# Patient Record
Sex: FEMALE | Race: BLACK OR AFRICAN AMERICAN | Employment: FULL TIME | URBAN - METROPOLITAN AREA
[De-identification: names, ages, dates, MRNs, and addresses within clinical notes are randomized per-mention and may not be internally consistent; named-entity substitution may affect disease eponyms.]

---

## 2017-02-02 ENCOUNTER — OFFICE VISIT (OUTPATIENT)
Dept: FAMILY MEDICINE CLINIC | Age: 22
End: 2017-02-02

## 2017-02-02 VITALS
WEIGHT: 206 LBS | HEART RATE: 83 BPM | RESPIRATION RATE: 18 BRPM | SYSTOLIC BLOOD PRESSURE: 176 MMHG | DIASTOLIC BLOOD PRESSURE: 112 MMHG | HEIGHT: 65 IN | BODY MASS INDEX: 34.32 KG/M2 | OXYGEN SATURATION: 99 % | TEMPERATURE: 97.9 F

## 2017-02-02 DIAGNOSIS — I10 ESSENTIAL HYPERTENSION WITH GOAL BLOOD PRESSURE LESS THAN 140/90: Primary | ICD-10-CM

## 2017-02-02 DIAGNOSIS — F33.2 SEVERE EPISODE OF RECURRENT MAJOR DEPRESSIVE DISORDER, WITHOUT PSYCHOTIC FEATURES (HCC): ICD-10-CM

## 2017-02-02 DIAGNOSIS — R55 SYNCOPE, UNSPECIFIED SYNCOPE TYPE: ICD-10-CM

## 2017-02-02 DIAGNOSIS — Z13.1 SCREENING FOR DIABETES MELLITUS (DM): ICD-10-CM

## 2017-02-02 RX ORDER — SERTRALINE HYDROCHLORIDE 100 MG/1
100 TABLET, FILM COATED ORAL DAILY
Qty: 30 TAB | Refills: 2 | Status: SHIPPED | OUTPATIENT
Start: 2017-02-02 | End: 2017-04-04 | Stop reason: SDUPTHER

## 2017-02-02 RX ORDER — HYDROCHLOROTHIAZIDE 25 MG/1
25 TABLET ORAL DAILY
Qty: 30 TAB | Refills: 2 | Status: SHIPPED | OUTPATIENT
Start: 2017-02-02 | End: 2017-04-04 | Stop reason: SDUPTHER

## 2017-02-02 NOTE — PATIENT INSTRUCTIONS
Stop lexapro and start on Zoloft. - take every day. Start taking HCTZ in addition to your present Blood pressure medication. High Blood Pressure: Care Instructions  Your Care Instructions  If your blood pressure is usually above 140/90, you have high blood pressure, or hypertension. That means the top number is 140 or higher or the bottom number is 90 or higher, or both. Despite what a lot of people think, high blood pressure usually doesn't cause headaches or make you feel dizzy or lightheaded. It usually has no symptoms. But it does increase your risk for heart attack, stroke, and kidney or eye damage. The higher your blood pressure, the more your risk increases. Your doctor will give you a goal for your blood pressure. Your goal will be based on your health and your age. An example of a goal is to keep your blood pressure below 140/90. Lifestyle changes, such as eating healthy and being active, are always important to help lower blood pressure. You might also take medicine to reach your blood pressure goal.  Follow-up care is a key part of your treatment and safety. Be sure to make and go to all appointments, and call your doctor if you are having problems. It's also a good idea to know your test results and keep a list of the medicines you take. How can you care for yourself at home? Medical treatment  · If you stop taking your medicine, your blood pressure will go back up. You may take one or more types of medicine to lower your blood pressure. Be safe with medicines. Take your medicine exactly as prescribed. Call your doctor if you think you are having a problem with your medicine. · Talk to your doctor before you start taking aspirin every day. Aspirin can help certain people lower their risk of a heart attack or stroke. But taking aspirin isn't right for everyone, because it can cause serious bleeding. · See your doctor regularly.  You may need to see the doctor more often at first or until your blood pressure comes down. · If you are taking blood pressure medicine, talk to your doctor before you take decongestants or anti-inflammatory medicine, such as ibuprofen. Some of these medicines can raise blood pressure. · Learn how to check your blood pressure at home. Lifestyle changes  · Stay at a healthy weight. This is especially important if you put on weight around the waist. Losing even 10 pounds can help you lower your blood pressure. · If your doctor recommends it, get more exercise. Walking is a good choice. Bit by bit, increase the amount you walk every day. Try for at least 30 minutes on most days of the week. You also may want to swim, bike, or do other activities. · Avoid or limit alcohol. Talk to your doctor about whether you can drink any alcohol. · Try to limit how much sodium you eat to less than 2,300 milligrams (mg) a day. Your doctor may ask you to try to eat less than 1,500 mg a day. · Eat plenty of fruits (such as bananas and oranges), vegetables, legumes, whole grains, and low-fat dairy products. · Lower the amount of saturated fat in your diet. Saturated fat is found in animal products such as milk, cheese, and meat. Limiting these foods may help you lose weight and also lower your risk for heart disease. · Do not smoke. Smoking increases your risk for heart attack and stroke. If you need help quitting, talk to your doctor about stop-smoking programs and medicines. These can increase your chances of quitting for good. When should you call for help? Call 911 anytime you think you may need emergency care. This may mean having symptoms that suggest that your blood pressure is causing a serious heart or blood vessel problem. Your blood pressure may be over 180/110. For example, call 911 if:  · You have symptoms of a heart attack. These may include:  ¨ Chest pain or pressure, or a strange feeling in the chest.  ¨ Sweating. ¨ Shortness of breath. ¨ Nausea or vomiting.   ¨ Pain, pressure, or a strange feeling in the back, neck, jaw, or upper belly or in one or both shoulders or arms. ¨ Lightheadedness or sudden weakness. ¨ A fast or irregular heartbeat. · You have symptoms of a stroke. These may include:  ¨ Sudden numbness, tingling, weakness, or loss of movement in your face, arm, or leg, especially on only one side of your body. ¨ Sudden vision changes. ¨ Sudden trouble speaking. ¨ Sudden confusion or trouble understanding simple statements. ¨ Sudden problems with walking or balance. ¨ A sudden, severe headache that is different from past headaches. · You have severe back or belly pain. Do not wait until your blood pressure comes down on its own. Get help right away. Call your doctor now or seek immediate care if:  · Your blood pressure is much higher than normal (such as 180/110 or higher), but you don't have symptoms. · You think high blood pressure is causing symptoms, such as:  ¨ Severe headache. ¨ Blurry vision. Watch closely for changes in your health, and be sure to contact your doctor if:  · Your blood pressure measures 140/90 or higher at least 2 times. That means the top number is 140 or higher or the bottom number is 90 or higher, or both. · You think you may be having side effects from your blood pressure medicine. · Your blood pressure is usually normal, but it goes above normal at least 2 times. Where can you learn more? Go to http://taylor-kasia.info/. Enter O942 in the search box to learn more about \"High Blood Pressure: Care Instructions. \"  Current as of: August 8, 2016  Content Version: 11.1  © 5257-9363 Healthwise, Incorporated. Care instructions adapted under license by [employer] (which disclaims liability or warranty for this information).  If you have questions about a medical condition or this instruction, always ask your healthcare professional. [address] any warranty or liability for your use of this information.

## 2017-02-02 NOTE — MR AVS SNAPSHOT
Visit Information Date & Time Provider Department Dept. Phone Encounter #  
 2/2/2017  3:30 PM Rolando Barnes NP [address] [phone] [id number] Upcoming Health Maintenance Date Due  
 HPV AGE 9Y-34Y (1 of 3 - Female 3 Dose Series) 12/22/2004 DTaP/Tdap/Td series (1 - Tdap) 12/22/2014 PAP AKA CERVICAL CYTOLOGY 12/22/2014 INFLUENZA AGE 9 TO ADULT 8/1/2016 Allergies as of 2/2/2017  Review Complete On: 2/2/2017 By: Rolando Barnes NP Severity Noted Reaction Type Reactions Amoxicillin  05/31/2016    Hives Penicillins  05/31/2016    Hives Current Immunizations  Never Reviewed No immunizations on file. Not reviewed this visit You Were Diagnosed With   
  
 Codes Comments Essential hypertension with goal blood pressure less than 140/90    -  Primary ICD-10-CM: I10 
ICD-9-CM: 401.9 Severe episode of recurrent major depressive disorder, without psychotic features ([facility]     ICD-10-CM: F33.2 ICD-9-CM: 296.33 Syncope, unspecified syncope type     ICD-10-CM: R55 
ICD-9-CM: 780.2 Screening for diabetes mellitus (DM)     ICD-10-CM: Z13.1 ICD-9-CM: V77.1 Vitals BP Pulse Temp Resp Height(growth percentile) Weight(growth percentile) (!) 176/112 83 97.9 °F (36.6 °C) 18 5' 5\" (1.651 m) 206 lb (93.4 kg) LMP SpO2 BMI OB Status Smoking Status 01/15/2017 99% 34.28 kg/m2 Having regular periods Former Smoker Vitals History BMI and BSA Data Body Mass Index Body Surface Area  
 34.28 kg/m 2 2.07 m 2 Preferred Pharmacy Pharmacy Name Phone [id number] [place], [facility] [address] [phone] Your Updated Medication List  
  
   
This list is accurate as of: 2/2/17  4:07 PM.  Always use your most recent med list.  
  
  
  
  
 albuterol 90 mcg/actuation inhaler Commonly known as:  PROVENTIL HFA, VENTOLIN HFA, PROAIR HFA  
 Take 2 Puffs by inhalation every four (4) hours as needed for Wheezing. Indications: cough  
  
 fluticasone 50 mcg/actuation nasal spray Commonly known as:  [place] 2 Sprays by Both Nostrils route nightly. Okay to decrease to 1 spray each nostril nightly after 1 week  
  
 hydroCHLOROthiazide 25 mg tablet Commonly known as:  HYDRODIURIL Take 1 Tab by mouth daily. NIFEdipine ER 30 mg ER tablet Commonly known as:  PROCARDIA XL Take 30 mg by mouth. sertraline 100 mg tablet Commonly known as:  ZOLOFT Take 1 Tab by mouth daily. [address] [facility] (28) 0.25-35 mg-mcg Tab Generic drug:  norgestimate-ethinyl estradiol Take 1 Tab by mouth daily. Indications: DYSMENORRHEA Prescriptions Sent to Pharmacy Refills  
 sertraline (ZOLOFT) 100 mg tablet 2 Sig: Take 1 Tab by mouth daily. Class: Normal  
 Pharmacy: [address] Ph #: [phone] Route: Oral  
 hydroCHLOROthiazide (HYDRODIURIL) 25 mg tablet 2 Sig: Take 1 Tab by mouth daily. Class: Normal  
 Pharmacy: [address] Ph #: [phone] Route: Oral  
  
To-Do List   
 02/02/2017 Lab:  HEMOGLOBIN A1C WITH EAG   
  
 02/02/2017 Lab:  METABOLIC PANEL, COMPREHENSIVE Patient Instructions Stop lexapro and start on Zoloft. - take every day. Start taking HCTZ in addition to your present Blood pressure medication. High Blood Pressure: Care Instructions Your Care Instructions If your blood pressure is usually above 140/90, you have high blood pressure, or hypertension. That means the top number is 140 or higher or the bottom number is 90 or higher, or both. Despite what a lot of people think, high blood pressure usually doesn't cause headaches or make you feel dizzy or lightheaded. It usually has no symptoms.  But it does increase your risk for heart attack, stroke, and kidney or eye damage. The higher your blood pressure, the more your risk increases. Your doctor will give you a goal for your blood pressure. Your goal will be based on your health and your age. An example of a goal is to keep your blood pressure below 140/90. Lifestyle changes, such as eating healthy and being active, are always important to help lower blood pressure. You might also take medicine to reach your blood pressure goal. 
Follow-up care is a key part of your treatment and safety. Be sure to make and go to all appointments, and call your doctor if you are having problems. It's also a good idea to know your test results and keep a list of the medicines you take. How can you care for yourself at home? Medical treatment · If you stop taking your medicine, your blood pressure will go back up. You may take one or more types of medicine to lower your blood pressure. Be safe with medicines. Take your medicine exactly as prescribed. Call your doctor if you think you are having a problem with your medicine. · Talk to your doctor before you start taking aspirin every day. Aspirin can help certain people lower their risk of a heart attack or stroke. But taking aspirin isn't right for everyone, because it can cause serious bleeding. · See your doctor regularly. You may need to see the doctor more often at first or until your blood pressure comes down. · If you are taking blood pressure medicine, talk to your doctor before you take decongestants or anti-inflammatory medicine, such as ibuprofen. Some of these medicines can raise blood pressure. · Learn how to check your blood pressure at home. Lifestyle changes · Stay at a healthy weight. This is especially important if you put on weight around the waist. Losing even 10 pounds can help you lower your blood pressure. · If your doctor recommends it, get more exercise. Walking is a good choice. Bit by bit, increase the amount you walk every day.  Try for at least 30 minutes on most days of the week. You also may want to swim, bike, or do other activities. · Avoid or limit alcohol. Talk to your doctor about whether you can drink any alcohol. · Try to limit how much sodium you eat to less than 2,300 milligrams (mg) a day. Your doctor may ask you to try to eat less than 1,500 mg a day. · Eat plenty of fruits (such as bananas and oranges), vegetables, legumes, whole grains, and low-fat dairy products. · Lower the amount of saturated fat in your diet. Saturated fat is found in animal products such as milk, cheese, and meat. Limiting these foods may help you lose weight and also lower your risk for heart disease. · Do not smoke. Smoking increases your risk for heart attack and stroke. If you need help quitting, talk to your doctor about stop-smoking programs and medicines. These can increase your chances of quitting for good. When should you call for help? Call 911 anytime you think you may need emergency care. This may mean having symptoms that suggest that your blood pressure is causing a serious heart or blood vessel problem. Your blood pressure may be over 180/110. For example, call 911 if: 
· You have symptoms of a heart attack. These may include: ¨ Chest pain or pressure, or a strange feeling in the chest. 
¨ Sweating. ¨ Shortness of breath. ¨ Nausea or vomiting. ¨ Pain, pressure, or a strange feeling in the back, neck, jaw, or upper belly or in one or both shoulders or arms. ¨ Lightheadedness or sudden weakness. ¨ A fast or irregular heartbeat. · You have symptoms of a stroke. These may include: 
¨ Sudden numbness, tingling, weakness, or loss of movement in your face, arm, or leg, especially on only one side of your body. ¨ Sudden vision changes. ¨ Sudden trouble speaking. ¨ Sudden confusion or trouble understanding simple statements. ¨ Sudden problems with walking or balance. ¨ A sudden, severe headache that is different from past headaches. · You have severe back or belly pain. Do not wait until your blood pressure comes down on its own. Get help right away. Call your doctor now or seek immediate care if: 
· Your blood pressure is much higher than normal (such as 180/110 or higher), but you don't have symptoms. · You think high blood pressure is causing symptoms, such as: ¨ Severe headache. ¨ Blurry vision. Watch closely for changes in your health, and be sure to contact your doctor if: 
· Your blood pressure measures 140/90 or higher at least 2 times. That means the top number is 140 or higher or the bottom number is 90 or higher, or both. · You think you may be having side effects from your blood pressure medicine. · Your blood pressure is usually normal, but it goes above normal at least 2 times. Where can you learn more? Go to http://taylor-kasia.info/. Enter V273 in the search box to learn more about \"High Blood Pressure: Care Instructions. \" Current as of: August 8, 2016 Content Version: 11.1 © 7663-3426 [employer]. Care instructions adapted under license by [employer] (which disclaims liability or warranty for this information). If you have questions about a medical condition or this instruction, always ask your healthcare professional. [address] any warranty or liability for your use of this information. Introducing [facility]! Nahomi Ch introduces [employer] patient portal. Now you can access parts of your medical record, email your doctor's office, and request medication refills online. 1. In your internet browser, go to [email or web address] 2. Click on the First Time User? Click Here link in the Sign In box. You will see the New Member Sign Up page. 3. Enter your [employer] Access Code exactly as it appears below. You will not need to use this code after youve completed the sign-up process.  If you do not sign up before the expiration date, you must request a new code. · Luminal Access Code: [email or web address] Expires: 4/29/2017  1:39 PM 
 
4. Enter the last four digits of your Social Security Number (xxxx) and Date of Birth (mm/dd/yyyy) as indicated and click Submit. You will be taken to the next sign-up page. 5. Create a Luminal ID. This will be your Luminal login ID and cannot be changed, so think of one that is secure and easy to remember. 6. Create a Luminal password. You can change your password at any time. 7. Enter your Password Reset Question and Answer. This can be used at a later time if you forget your password. 8. Enter your e-mail address. You will receive e-mail notification when new information is available in [address] 9. Click Sign Up. You can now view and download portions of your medical record. 10. Click the Download Summary menu link to download a portable copy of your medical information. If you have questions, please visit the Frequently Asked Questions section of the Luminal website. Remember, Luminal is NOT to be used for urgent needs. For medical emergencies, dial 911. Now available from your iPhone and Android! Please provide this summary of care documentation to your next provider. Your primary care clinician is listed as Akilah Isreal. If you have any questions after today's visit, please call [phone].

## 2017-02-02 NOTE — PROGRESS NOTES
Chief Complaint   Patient presents with    Complete Physical     1. Have you been to the ER, urgent care clinic since your last visit? Hospitalized since your last visit? Yes Where: [facility] for a seizure and fainting     2. Have you seen or consulted any other health care providers outside of the [address] [place] [address] since your last visit? Include any pap smears or colon screening.  No

## 2017-02-02 NOTE — LETTER
NOTIFICATION OF RETURN TO WORK / SCHOOL 
 
2/2/2017 4:44 PM 
 
Ms. Rad Upton [facility] [id number] [facility] [address] Mychal Pineda To Whom It May Concern: 
 
Rad Upton was under the care of [address] [facility]. She will be able to return to work/school on 2/2/2017-2/06/2017. If there are questions or concerns please have the patient contact our office. Sincerely, Jorge Luis Joiner LPN

## 2017-02-07 NOTE — PROGRESS NOTES
[address] [facility] – [place], [place]  Primary Care Office Visit - Complete Physical    Elsie Gomez is a 19 y.o. female presenting for annual physical.  : 1995     Assessment/Plan:   Tino Clinton was seen today for complete physical.    Diagnoses and all orders for this visit:    Essential hypertension with goal blood pressure less than 140/90  -     hydroCHLOROthiazide (HYDRODIURIL) 25 mg tablet; Take 1 Tab by mouth daily. Severe episode of recurrent major depressive disorder, without psychotic features ([address]  -     sertraline (ZOLOFT) 100 mg tablet; Take 1 Tab by mouth daily. Syncope, unspecified syncope type  -     METABOLIC PANEL, COMPREHENSIVE; Future  -     HEMOGLOBIN A1C WITH EAG; Future    Screening for diabetes mellitus (DM)  -     HEMOGLOBIN A1C WITH EAG; Future          Management plan & patient instructions reviewed with Elsie Patricia, who voiced understanding. MONICA Kaur  [address], [address] [place] [address]  [phone] (fax)  2017, 1:07 PM    Patient Instructions (provided in AVS):     Stop lexapro and start on Zoloft. - take every day. Start taking HCTZ in addition to your present Blood pressure medication. High Blood Pressure: Care Instructions      History:   Elsie Gomez is a 19 y.o. female presenting for an annual physical.    Pt here for CPE, has complaint of recent possible seizure/syncope, was evaluated at [facility] 2017, chart reviewed in full and summarized- pt with normal cmp, head CT, mildly elevated blood pressure. [address] [address] home with follow up at PCP. She had no injuries from incident, no bites/bruising on inside of mouth or tongue. Denies alcohol or illicit drug use. Pt denies cp, sob, palpitations, racing heart rate. Has HTN and depression and feels like her lexapro is not working well.   Pt also continues with elevated blood pressures and on review has never had adequately controlled pressures, she states that she has been taking her medication as rx'd and watching salt intake. Past Medical History   Diagnosis Date    Anxiety     Depression     Hypertension      No past surgical history on file. reports that she quit smoking about 13 months ago. She has never used smokeless tobacco. She reports that she drinks about 1.2 oz of alcohol per week  She reports that she does not use illicit drugs. Social History     Social History Narrative     History   Smoking Status    Former Smoker    Quit date: 1/1/2016   Smokeless Tobacco    Never Used     No family history on file. Allergies   Allergen Reactions    Amoxicillin Hives    Penicillins Hives       Problem List:      Patient Active Problem List    Diagnosis    Severe episode of recurrent major depressive disorder, without psychotic features ([facility]     Started on lexapro 5/31/2016, referred to couselor.  Essential hypertension with goal blood pressure less than 140/90     Started on CCB prox 1 yr ago (2015) by gyn with gradual increases, currently on Procardia xl 30 mg daily.  Dysmenorrhea treated with oral contraceptive       Medications:     Current Outpatient Prescriptions   Medication Sig    sertraline (ZOLOFT) 100 mg tablet Take 1 Tab by mouth daily.  hydroCHLOROthiazide (HYDRODIURIL) 25 mg tablet Take 1 Tab by mouth daily.  albuterol (PROVENTIL HFA, VENTOLIN HFA, PROAIR HFA) 90 mcg/actuation inhaler Take 2 Puffs by inhalation every four (4) hours as needed for Wheezing. Indications: cough    NIFEdipine ER (PROCARDIA XL) 30 mg ER tablet Take 30 mg by mouth.  SPRINTEC, 28, 0.25-35 mg-mcg tab Take 1 Tab by mouth daily. Indications: DYSMENORRHEA    fluticasone (FLONASE) 50 mcg/actuation nasal spray 2 Sprays by Both Nostrils route nightly. Okay to decrease to 1 spray each nostril nightly after 1 week     No current facility-administered medications for this visit.         Review of Systems:     (positives in bold) CONST:   fatigue, weight change, appetite change, fevers, chills  NEURO:   headaches, vision changes, dizziness, loss of consciousness  HEENT: red eyes, eye discharge, vision changes, light sensitivity, ear pain, ear pressure, ear discharge/drainage, hearing change, nosebleeds, sneezing, runny nose, nasal congestion, change in sense of smell, sore throat, voice change or hoarse voice, dry mouth, toothache, jaw popping, difficulty swallowing, painful swallowing, oral ulcers or canker sores  CV:      chest pain, palpitations, orthopnea, PND  PULM:  dyspnea, wheezing, cough, sputum production, hemoptysis  GI:             nausea, vomiting, abdominal pain, blood in stool,     diarrhea, constipation  :       dysuria, hematuria, change in urine, urinary frequency, urinary urgency  MS:      muscle/joint pain, joint swelling  SKIN:        rashes, skin changes  ALLERGY: seasonal allergies, itchy eyes  HEME:  easy bleeding/bruising  Psych: Suicidal ideation, homicidal ideation    Physical Assessment:   VS:    Visit Vitals    BP (!) 176/112    Pulse 83    Temp 97.9 °F (36.6 °C)    Resp 18    Ht 5' 5\" (1.651 m)    Wt 206 lb (93.4 kg)    LMP 01/15/2017    SpO2 99%    BMI 34.28 kg/m2       General:   Well-developed, well-nourished    Head:   PERRL, EOMI.  MMM, oropharynx WNL. No facial asymmetry noted. Ears:  Bilateral TMs wnl. Bilateral EACs wnl. Neck:   Neck supple, no thyromegaly  Cardiovasc:   No JVD. RRR, no murmur, gallop, rub. Pulses 2+ and symmetric at distal extremities. Pulmonary:   Lungs clear bilaterally. Normal respiratory effort. Abdomen:   Abdomen soft, NT, ND, NAB. No masses or organomegaly. Extremities:   No edema, LEs warm and well-perfused. Neuro:   Alert and oriented, no focal deficits. Skin:    No rashes noted. Lymph:   No cervical, pre- or post-auricular, submandibular, occipital, axillary or     inguinal  lymphadenopathy. Psych: pleasant, and conversant.   Affect, mood & judgment appropriate. Recent Labs & Imaging:     No results found for this or any previous visit (from the past 12 hour(s)).

## 2017-03-27 DIAGNOSIS — I10 ESSENTIAL HYPERTENSION WITH GOAL BLOOD PRESSURE LESS THAN 140/90: ICD-10-CM

## 2017-03-27 DIAGNOSIS — F33.2 SEVERE EPISODE OF RECURRENT MAJOR DEPRESSIVE DISORDER, WITHOUT PSYCHOTIC FEATURES (HCC): ICD-10-CM

## 2017-03-27 RX ORDER — HYDROCHLOROTHIAZIDE 25 MG/1
25 TABLET ORAL DAILY
Qty: 30 TAB | Refills: 2 | OUTPATIENT
Start: 2017-03-27

## 2017-03-27 RX ORDER — SERTRALINE HYDROCHLORIDE 100 MG/1
100 TABLET, FILM COATED ORAL DAILY
Qty: 30 TAB | Refills: 2 | OUTPATIENT
Start: 2017-03-27

## 2017-03-27 NOTE — TELEPHONE ENCOUNTER
Requested Prescriptions     Pending Prescriptions Disp Refills    hydroCHLOROthiazide (HYDRODIURIL) 25 mg tablet 30 Tab 2     Sig: Take 1 Tab by mouth daily.  sertraline (ZOLOFT) 100 mg tablet 30 Tab 2     Sig: Take 1 Tab by mouth daily.      Per fax from [facility] 90 day supply is requested for both medications

## 2017-04-04 DIAGNOSIS — F33.2 SEVERE EPISODE OF RECURRENT MAJOR DEPRESSIVE DISORDER, WITHOUT PSYCHOTIC FEATURES (HCC): ICD-10-CM

## 2017-04-04 DIAGNOSIS — I10 ESSENTIAL HYPERTENSION WITH GOAL BLOOD PRESSURE LESS THAN 140/90: ICD-10-CM

## 2017-04-04 RX ORDER — HYDROCHLOROTHIAZIDE 25 MG/1
25 TABLET ORAL DAILY
Qty: 90 TAB | Refills: 1 | Status: SHIPPED | OUTPATIENT
Start: 2017-04-04 | End: 2017-06-02

## 2017-04-04 RX ORDER — SERTRALINE HYDROCHLORIDE 100 MG/1
100 TABLET, FILM COATED ORAL DAILY
Qty: 30 TAB | Refills: 2 | Status: SHIPPED | OUTPATIENT
Start: 2017-04-04 | End: 2017-06-02

## 2017-04-04 NOTE — TELEPHONE ENCOUNTER
Spoke with provider sent over 90 day supply for hydrochlorothiazide but not for zoloft, zoloft will remain 30 days.

## 2022-06-02 PROBLEM — Z87.59 H/O FETAL DEMISE, NOT CURRENTLY PREGNANT: Status: ACTIVE | Noted: 2022-06-02

## 2022-06-02 PROBLEM — O36.8390 NON-REASSURING FETAL CARDIOTOCOGRAPHIC TRACING: Status: ACTIVE | Noted: 2022-06-02

## 2023-01-31 RX ORDER — ONDANSETRON 4 MG/1
4 TABLET, FILM COATED ORAL EVERY 6 HOURS PRN
COMMUNITY
Start: 2022-04-08

## 2023-10-19 ENCOUNTER — OFFICE VISIT (OUTPATIENT)
Age: 28
End: 2023-10-19
Payer: COMMERCIAL

## 2023-10-19 VITALS
BODY MASS INDEX: 43.19 KG/M2 | RESPIRATION RATE: 16 BRPM | SYSTOLIC BLOOD PRESSURE: 135 MMHG | HEART RATE: 85 BPM | WEIGHT: 253 LBS | DIASTOLIC BLOOD PRESSURE: 79 MMHG | OXYGEN SATURATION: 98 % | HEIGHT: 64 IN

## 2023-10-19 DIAGNOSIS — E66.01 MORBID OBESITY (HCC): Primary | ICD-10-CM

## 2023-10-19 DIAGNOSIS — Z01.818 PRE-OP TESTING: ICD-10-CM

## 2023-10-19 DIAGNOSIS — K21.9 GASTROESOPHAGEAL REFLUX DISEASE, UNSPECIFIED WHETHER ESOPHAGITIS PRESENT: ICD-10-CM

## 2023-10-19 DIAGNOSIS — Z71.89 ENCOUNTER FOR PRE-BARIATRIC SURGERY COUNSELING AND EDUCATION: ICD-10-CM

## 2023-10-19 PROCEDURE — 99203 OFFICE O/P NEW LOW 30 MIN: CPT | Performed by: NURSE PRACTITIONER

## 2023-10-19 PROCEDURE — 3074F SYST BP LT 130 MM HG: CPT | Performed by: NURSE PRACTITIONER

## 2023-10-19 PROCEDURE — 3078F DIAST BP <80 MM HG: CPT | Performed by: NURSE PRACTITIONER

## 2023-10-19 RX ORDER — UBIDECARENONE 75 MG
50 CAPSULE ORAL DAILY
COMMUNITY

## 2023-10-25 ENCOUNTER — TELEPHONE (OUTPATIENT)
Age: 28
End: 2023-10-25

## 2023-10-25 NOTE — TELEPHONE ENCOUNTER
Pt called to ask what the difference would be between [facility] and surgery - due to the length of the WLT. Let patient know that the WLT is for the purposes of insurance to qualify her for surgery. MWL would involve meeting with NP regularly and her treatment plan would be dependent on her medical history.     Pt will continue with surgical weight loss

## 2023-10-30 ENCOUNTER — HOSPITAL ENCOUNTER (OUTPATIENT)
Facility: HOSPITAL | Age: 28
Discharge: HOME | End: 2023-11-02
Payer: COMMERCIAL

## 2023-10-30 ENCOUNTER — HOSPITAL ENCOUNTER (OUTPATIENT)
Facility: HOSPITAL | Age: 28
Discharge: HOME | End: 2023-11-02

## 2023-10-30 DIAGNOSIS — K21.9 GASTROESOPHAGEAL REFLUX DISEASE, UNSPECIFIED WHETHER ESOPHAGITIS PRESENT: ICD-10-CM

## 2023-10-30 DIAGNOSIS — E66.01 MORBID OBESITY (HCC): ICD-10-CM

## 2023-10-30 DIAGNOSIS — Z01.818 PRE-OP TESTING: ICD-10-CM

## 2023-10-30 LAB
ALBUMIN SERPL-MCNC: 4 G/DL (ref 3.4–5)
ALBUMIN/GLOB SERPL: 0.9 (ref 0.8–1.7)
ALP SERPL-CCNC: 85 U/L (ref 45–117)
ALT SERPL-CCNC: 37 U/L (ref 13–56)
ANION GAP SERPL CALC-SCNC: 6 MMOL/L (ref 3–18)
AST SERPL-CCNC: 21 U/L (ref 10–38)
BASOPHILS # BLD: 0 K/UL (ref 0–0.1)
BASOPHILS NFR BLD: 0 % (ref 0–2)
BILIRUB SERPL-MCNC: 0.3 MG/DL (ref 0.2–1)
BUN SERPL-MCNC: 15 MG/DL (ref 7–18)
BUN/CREAT SERPL: 15 (ref 12–20)
CALCIUM SERPL-MCNC: 8.9 MG/DL (ref 8.5–10.1)
CHLORIDE SERPL-SCNC: 105 MMOL/L (ref 100–111)
CO2 SERPL-SCNC: 29 MMOL/L (ref 21–32)
CREAT SERPL-MCNC: 0.98 MG/DL (ref 0.6–1.3)
DIFFERENTIAL METHOD BLD: ABNORMAL
EKG ATRIAL RATE: 71 BPM
EKG DIAGNOSIS: NORMAL
EKG P AXIS: 42 DEGREES
EKG P-R INTERVAL: 178 MS
EKG Q-T INTERVAL: 396 MS
EKG QRS DURATION: 86 MS
EKG QTC CALCULATION (BAZETT): 430 MS
EKG R AXIS: 27 DEGREES
EKG T AXIS: 19 DEGREES
EKG VENTRICULAR RATE: 71 BPM
EOSINOPHIL # BLD: 0.2 K/UL (ref 0–0.4)
EOSINOPHIL NFR BLD: 2 % (ref 0–5)
ERYTHROCYTE [DISTWIDTH] IN BLOOD BY AUTOMATED COUNT: 15.1 % (ref 11.6–14.5)
EST. AVERAGE GLUCOSE BLD GHB EST-MCNC: 105 MG/DL
GLOBULIN SER CALC-MCNC: 4.5 G/DL (ref 2–4)
GLUCOSE SERPL-MCNC: 86 MG/DL (ref 74–99)
HBA1C MFR BLD: 5.3 % (ref 4.2–5.6)
HCT VFR BLD AUTO: 38.5 % (ref 35–45)
HGB BLD-MCNC: 12.1 G/DL (ref 12–16)
IMM GRANULOCYTES # BLD AUTO: 0 K/UL (ref 0–0.04)
IMM GRANULOCYTES NFR BLD AUTO: 0 % (ref 0–0.5)
IRON SERPL-MCNC: 44 UG/DL (ref 50–175)
LYMPHOCYTES # BLD: 2.6 K/UL (ref 0.9–3.6)
LYMPHOCYTES NFR BLD: 29 % (ref 21–52)
MCH RBC QN AUTO: 28.4 PG (ref 24–34)
MCHC RBC AUTO-ENTMCNC: 31.4 G/DL (ref 31–37)
MCV RBC AUTO: 90.4 FL (ref 78–100)
MONOCYTES # BLD: 0.6 K/UL (ref 0.05–1.2)
MONOCYTES NFR BLD: 7 % (ref 3–10)
NEUTS SEG # BLD: 5.4 K/UL (ref 1.8–8)
NEUTS SEG NFR BLD: 62 % (ref 40–73)
NRBC # BLD: 0 K/UL (ref 0–0.01)
NRBC BLD-RTO: 0 PER 100 WBC
PLATELET # BLD AUTO: 370 K/UL (ref 135–420)
PMV BLD AUTO: 10.5 FL (ref 9.2–11.8)
POTASSIUM SERPL-SCNC: 4.2 MMOL/L (ref 3.5–5.5)
PROT SERPL-MCNC: 8.5 G/DL (ref 6.4–8.2)
RBC # BLD AUTO: 4.26 M/UL (ref 4.2–5.3)
SODIUM SERPL-SCNC: 140 MMOL/L (ref 136–145)
TSH SERPL DL<=0.05 MIU/L-ACNC: 0.71 UIU/ML (ref 0.36–3.74)
UREA BREATH TEST QL: NEGATIVE
VIT B12 SERPL-MCNC: 775 PG/ML (ref 211–911)
WBC # BLD AUTO: 8.8 K/UL (ref 4.6–13.2)

## 2023-10-30 PROCEDURE — 6370000000 HC RX 637 (ALT 250 FOR IP): Performed by: NURSE PRACTITIONER

## 2023-10-30 PROCEDURE — 82607 VITAMIN B-12: CPT

## 2023-10-30 PROCEDURE — 93005 ELECTROCARDIOGRAM TRACING: CPT

## 2023-10-30 PROCEDURE — 82306 VITAMIN D 25 HYDROXY: CPT

## 2023-10-30 PROCEDURE — 2500000003 HC RX 250 WO HCPCS: Performed by: NURSE PRACTITIONER

## 2023-10-30 PROCEDURE — 36415 COLL VENOUS BLD VENIPUNCTURE: CPT

## 2023-10-30 PROCEDURE — 83036 HEMOGLOBIN GLYCOSYLATED A1C: CPT

## 2023-10-30 PROCEDURE — 83540 ASSAY OF IRON: CPT

## 2023-10-30 PROCEDURE — 85025 COMPLETE CBC W/AUTO DIFF WBC: CPT

## 2023-10-30 PROCEDURE — 74240 X-RAY XM UPR GI TRC 1CNTRST: CPT

## 2023-10-30 PROCEDURE — 84425 ASSAY OF VITAMIN B-1: CPT

## 2023-10-30 PROCEDURE — 84443 ASSAY THYROID STIM HORMONE: CPT

## 2023-10-30 PROCEDURE — 80053 COMPREHEN METABOLIC PANEL: CPT

## 2023-10-30 PROCEDURE — 93010 ELECTROCARDIOGRAM REPORT: CPT | Performed by: INTERNAL MEDICINE

## 2023-10-30 RX ADMIN — BARIUM SULFATE 88 ML: 960 POWDER, FOR SUSPENSION ORAL at 09:43

## 2023-10-30 RX ADMIN — ANTACID/ANTIFLATULENT 1 EACH: 380; 550; 10; 10 GRANULE, EFFERVESCENT ORAL at 09:43

## 2023-10-30 RX ADMIN — BARIUM SULFATE 140 ML: 980 POWDER, FOR SUSPENSION ORAL at 09:44

## 2023-10-31 ENCOUNTER — CLINICAL DOCUMENTATION (OUTPATIENT)
Facility: HOSPITAL | Age: 28
End: 2023-10-31

## 2023-10-31 LAB — 25(OH)D3 SERPL-MCNC: 9.1 NG/ML (ref 30–100)

## 2023-10-31 NOTE — PROGRESS NOTES
[address] [facility] [address], [address]    Patient's Name: Eloy Schwab   Age: 32 y.o. YOB: 1995   Sex: female           Session: 1 of  3  Surgeon:  Dr. Dorita Goodrich    Height: 5' 4\"   Weight:    253      Lbs. BMI: 43     Starting Weight: 253       Patient has not attended a support group meeting. Do you smoke? no    Alcohol intake:  one drink two times a week. Class Guidelines    Guidelines are reviewed with patient at the start of every class. 1. Patient understands that weight loss trial classes must be consecutive. Patient understands if they miss a class, it is their responsibility to contact me to reschedule class. I will reach out to patient after their first no show. 2.  Patient understands the expectations that weight maintenance/weight loss is expected during the classes. Failure to demonstrate changes may result in one extra month of weight loss trial, followed by going back to see the surgeon. 3. Patient is also instructed to be doing their labs, blood work, psych visit, support group and any other test that the surgeon has used while they are working on their weight loss trial.  4. Patient is instructed to bring their education binder to all classes. Eating Habits and Behaviors      Today we reviewed key diet principles. We talked about patient following a low calorie/low carbohydrate diet while they are in weight loss trials. To achieve this, patient is encouraged to avoid liquid calories, including alcohol, soda, sweet tea, and fruit juices. Patient can cut carbohydrates by trying to stick to meat and vegetables. Patient can also eat eggs, cheese, and good fat, while trying to eliminate starches, such as pasta, rice, crackers, chips, popcorn. I also gave a power point that included 21 Ways to Stay on Track with a Healthy Lifestyle.   Some of the food-related suggestions included

## 2023-11-02 LAB — VIT B1 BLD-SCNC: 84.2 NMOL/L (ref 66.5–200)

## 2023-11-03 DIAGNOSIS — Z01.818 PRE-OP TESTING: ICD-10-CM

## 2023-11-03 DIAGNOSIS — E66.01 MORBID OBESITY (HCC): Primary | ICD-10-CM

## 2023-11-03 DIAGNOSIS — R94.31 ABNORMAL EKG: ICD-10-CM

## 2023-11-03 DIAGNOSIS — E55.9 VITAMIN D DEFICIENCY: ICD-10-CM

## 2023-11-03 RX ORDER — CHOLECALCIFEROL (VITAMIN D3) 1250 MCG
50000 CAPSULE ORAL
Qty: 12 CAPSULE | Refills: 1 | Status: SHIPPED | OUTPATIENT
Start: 2023-11-03

## 2023-11-27 ENCOUNTER — CLINICAL DOCUMENTATION (OUTPATIENT)
Facility: HOSPITAL | Age: 28
End: 2023-11-27

## 2023-11-27 NOTE — PROGRESS NOTES
Patient no-showed today's WLT class despite reminders. RD will reach out by phone and e-mail to reschedule.    Eddie Mazariegos RD

## 2023-11-30 ENCOUNTER — CLINICAL DOCUMENTATION (OUTPATIENT)
Facility: HOSPITAL | Age: 28
End: 2023-11-30

## 2023-11-30 ENCOUNTER — HOSPITAL ENCOUNTER (OUTPATIENT)
Facility: HOSPITAL | Age: 28
Discharge: HOME | End: 2023-12-03

## 2023-11-30 NOTE — PROGRESS NOTES
[address] [facility] [address], [address]    Patient's Name: Sydnee Givens     Age: 32 y.o. YOB: 1995     Sex: female            Session 2 of 3   Surgeon:  Dr. Devorah Velazquez    Height: 5' 4\"   Weight:    253      Lbs. BMI: 43     Starting Weight: 253         Do you smoke? no    Alcohol intake: Occasionally drink 1-2 drinks. Not often. Class Guidelines    Guidelines are reviewed with patient at the start of every class. 1. Patient understands that weight loss trial classes must be consecutive. Patient understands if they miss a class, it is their responsibility to contact me to reschedule class. I will reach out to patient after their first no show. 2.  Patient understands the expectations that weight maintenance/weight loss is expected during the classes. Failure to demonstrate changes may result in one extra month of weight loss trial, followed by going back to see the surgeon. 3. Patient is also instructed to be doing their labs, blood work, psych visit, support group and any other test that the surgeon has used while they are working on their weight loss trial.      Patient has been to a support group meeting. Changes Made Since Last Class: no fried foods and potatoes. Dietary Instruction    During today's class we continued to focus on the key diet principles. Patient was instructed to follow a low carbohydrate diet, focusing on meat and vegetables. Patient was instructed to stop liquid calories and aim for 64 ounces of water per day. In class, I also gave patient a power point on surviving the holidays. Some of the tips included survival tips for parties, including bringing their own low carbohydrate dish to a potluck dinner and surveying the buffet line before they start filling up their plate.   Patient was given cooking alternatives, including using Splenda for sugar, substituting

## 2023-12-08 ENCOUNTER — TELEPHONE (OUTPATIENT)
Age: 28
End: 2023-12-08

## 2023-12-08 NOTE — TELEPHONE ENCOUNTER
[facility] tech requesting dx code for h pylori breath test done on 10/19/23. I agev Tech [id number] and K21.9. Tech verified dx codes and stated they were good. Call ended.

## 2023-12-11 ENCOUNTER — HOSPITAL ENCOUNTER (OUTPATIENT)
Facility: HOSPITAL | Age: 28
Discharge: HOME | End: 2023-12-14

## 2023-12-12 ENCOUNTER — CLINICAL DOCUMENTATION (OUTPATIENT)
Facility: HOSPITAL | Age: 28
End: 2023-12-12

## 2023-12-12 NOTE — PROGRESS NOTES
[address] [facility] [address], [address]    Patient's Name: Keysha Lentz   Age: 32 y.o. YOB: 1995   Sex: female        Session: 3 of  3  Surgeon:  Dr. Kristen Lamar    Height: 5' 4\"   Weight:    247      Lbs. BMI: 42     Starting Weight: 253     Overall Pounds Lost: 6       Do you smoke? no    Alcohol intake:  one drink a week. Class Guidelines    Guidelines are reviewed with patient at the start of every class. 1. Patient understands that weight loss trial classes must be consecutive. Patient understands if they miss a class, it is their responsibility to contact me to reschedule class. I will reach out to patient after their first no show. 2.  Patient understands the expectations that weight maintenance/weight loss is expected during the classes. Failure to demonstrate changes may result in one extra month of weight loss trial, followed by going back to see the surgeon. 3. Patient is also instructed to be doing their labs, blood work, psych visit, support group and any other test that the surgeon has used while they are working on their weight loss trial.      Patient has attended support group. Eating Habits and Behaviors      Today we reviewed key diet principles. We talked about protein drinks and ones that would be okay. Patient was encouraged to start getting into a routine now and drinking a shake. Patient may use for a meal replacement or a snack. Patient was also encouraged to stop liquid calories. We talked about fluid choices that would be okay. We also spent a lot of time talking about carbohydrates. Patient was encouraged to start cutting their carbohydrates out and keep them less than 100 grams per day. Patient was given examples of carbohydrates that are in food. We also talked about the power of protein and the importance of getting more protein in per day than carbohydrates.      I also

## 2023-12-12 NOTE — PROGRESS NOTES
Nutrition Evaluation    Patient's Name: Eloy Schwab   Age: 32 y.o. YOB: 1995   Sex: female    Height: 5'4\" Weight: 247 BMI:  42  Starting Weight:  253        Smoking Status:  no  Alcohol Intake:  1 drink a week. Changes made during classes include:  Smaller portions. No carbs/count calories. No fast food. Two things that patient learned during this weight loss trial:  Portions  Water is great for you. Summary:  I feel that Eloy Schwab has demonstrated appropriate diet changes and is ready to move forward with surgery. Patient has been briefed on the importance of the protein drinks, vitamins, and the transition of the diet stages. Patient understands that the long-term diet will focus on protein and vegetables. Patient understand the effects of carbohydrates after surgery and what reactive hypoglycemia is. Patient is aware that they will be attending pre-op class 2 weeks before surgery and will get more detailed information on the post-op diet guidelines. Patient will see me again at 6 weeks post-op. At this 6 week visit, RD will assess how patient is tolerating soft protein and advance to vegetables, if tolerating soft protein without difficulty. Patient will also see RD again at 9 months post-op. This visit will assess patient's compliance with current protocol, including diet, vitamins, protein shakes, and exercise. Post-op diet guidelines will be reinforced. RD is available for questions and to meet with patient outside of the 6 week and 9 month post-op visit. We spent a lot of time talking about the vitamins. Patient understands the importance of being compliant with the diet protocol and the complications and risks that can occur if they are non-compliant with the nutritional protocol. Patient has attended at least one support group.     Candidate for surgery: Yes    Kevan Yuan RD    12/12/2023

## 2024-01-05 ENCOUNTER — OFFICE VISIT (OUTPATIENT)
Age: 29
End: 2024-01-05
Payer: COMMERCIAL

## 2024-01-05 VITALS
HEIGHT: 64 IN | DIASTOLIC BLOOD PRESSURE: 75 MMHG | TEMPERATURE: 97.9 F | WEIGHT: 251 LBS | RESPIRATION RATE: 16 BRPM | HEART RATE: 75 BPM | BODY MASS INDEX: 42.85 KG/M2 | SYSTOLIC BLOOD PRESSURE: 129 MMHG

## 2024-01-05 DIAGNOSIS — Z71.89 ENCOUNTER FOR PRE-BARIATRIC SURGERY COUNSELING AND EDUCATION: ICD-10-CM

## 2024-01-05 DIAGNOSIS — Z85.858 HISTORY OF ADRENAL CANCER: ICD-10-CM

## 2024-01-05 DIAGNOSIS — E55.9 VITAMIN D DEFICIENCY: ICD-10-CM

## 2024-01-05 DIAGNOSIS — K21.9 GASTROESOPHAGEAL REFLUX DISEASE, UNSPECIFIED WHETHER ESOPHAGITIS PRESENT: ICD-10-CM

## 2024-01-05 DIAGNOSIS — E66.01 MORBID OBESITY (HCC): Primary | ICD-10-CM

## 2024-01-05 PROCEDURE — 99214 OFFICE O/P EST MOD 30 MIN: CPT | Performed by: SURGERY

## 2024-01-05 PROCEDURE — 3074F SYST BP LT 130 MM HG: CPT | Performed by: SURGERY

## 2024-01-05 PROCEDURE — 3078F DIAST BP <80 MM HG: CPT | Performed by: SURGERY

## 2024-01-05 NOTE — PROGRESS NOTES
Bariatric Surgery Progress Note    CC: Preop appointment for bariatric surgery  Subjective:     Patient presents for a preop appointment for bariatric surgery.     REVIEW:    History of left adrenal mass, reports HTN due to this. Resolution of HTN with lap left adrenalectomy in 2020.    Lab review:  CBC normal  CMP normal  A1c 5.3%  TSH normal  B1 normal  B12 normal  Vitamin D 9.1, prescribed replacement therapy today, sent to update pharmacy  H pylori negative    Nutrition: Finished all required nutrition sessions and cleared by dietitian    Psych: pending mandatory pre-bariatric surgery psychological evaluation and clearance by psychology service, has not scheduled yet    Attended support group    PCP: Clearance letter/note pending    Abnormal EKG noted, repeat pending, may need cardiac risk stratification    Reviewed case with Dr. Kayleigh Givens at [facility], she needs follow up including repeat CT CAP prior to clearance.    EGD / UGI reviewed / issues:   UGI  IMPRESSION:  Gastroesophageal reflux; otherwise, unremarkable double contrast upper  gastrointestinal series.     Denies nausea, vomiting, dysphagia  Reflux/heartburn with ETOH, greasy foods, mint flavor, tomatoes, spicy foods. If she avoids these foods, will not experience but will take Maalox prn with good results.     Previous relevant surgeries: laparoscopic left adrenalectomy 3/2020    Patient Active Problem List    Diagnosis Date Noted    Non-reassuring fetal cardiotocographic tracing 06/02/2022    H/O fetal demise, not currently pregnant 06/02/2022    Severe episode of recurrent major depressive disorder, without psychotic features ([facility]) 05/31/2016    Dysmenorrhea treated with oral contraceptive 05/31/2016    Essential hypertension with goal blood pressure less than 140/90 05/31/2016      Past Medical History:   Diagnosis Date    Anxiety     Depression     Hypertension     PCOS (polycystic ovarian syndrome)     PID (acute pelvic inflammatory disease)

## 2024-01-05 NOTE — PROGRESS NOTES
Alex Dumont is a 28 y.o. female (: 1995)     Chief Complaint   Patient presents with    Follow-up     Mid trial        Medication list and allergies have been reviewed with Alex Dumont and updated as of today's date.     I have gone over all Medical, Surgical and Social History with Alex Dumont and updated/added the information accordingly.     1. Have you been to the ER, urgent care clinic since your last visit?  Hospitalized since your last visit?No    2. Have you seen or consulted any other health care providers outside of the [facility] since your last visit?  Include any pap smears or colon screening. No

## 2024-01-25 ENCOUNTER — TELEPHONE (OUTPATIENT)
Age: 29
End: 2024-01-25

## 2024-01-25 NOTE — TELEPHONE ENCOUNTER
Ms. Dumont, had been advised to have a repeat EKG.  She hasn't done it yet.   Also she hasn't scheduled psych evaluation.   PCP clearance is still missing, too.     I had sent her instructions on how to schedule psych, a reminder to repeat EKG, and to make sure that she schedule a PCP clearance appt with her primary doctor.     Vy

## 2024-02-22 ENCOUNTER — TELEPHONE (OUTPATIENT)
Age: 29
End: 2024-02-22

## 2024-02-22 NOTE — TELEPHONE ENCOUNTER
I called and I spoke to MsLisa Dumont.   This call is regarding pending bariatric requirements.     Per NP Cassidy is to repeat EKG.     Per Dr. Aquino the patient is to do CT Chest /Abdomen -Pending    Psych-Pending    The patient stated that even though she is still interested in surgery, if it is possible for her to change to Medicine, instead.     I stated that I was sending aa message to the provider.     Vy

## 2024-02-27 ENCOUNTER — TELEPHONE (OUTPATIENT)
Age: 29
End: 2024-02-27

## 2024-02-27 NOTE — TELEPHONE ENCOUNTER
I called and I LVM.  The patient had inquired regarding MWL.  She is currently enrolled in the [facility].     The message, per Administration, no new enrolls for the MWL, anyone interested can be put in the wait list.  Projected to re-start the program in July, 2024.     Vy

## 2024-03-26 ENCOUNTER — TELEPHONE (OUTPATIENT)
Age: 29
End: 2024-03-26

## 2024-03-26 NOTE — TELEPHONE ENCOUNTER
Patient called on 3.26.24 having questions about possibly switching to weight loss medication instead of bariatric surgery. She wants to know what would be best for her and she wants to consider her options. She also understands there is a wait list for the medication until the MWL program restarts. I offered to schedule a follow up so she can discuss the matter but she did not want to schedule one at the time and wanted to know if she could get a phone call instead?    Patient can be reached at [phone].       Thank you!

## 2024-03-28 ENCOUNTER — TELEPHONE (OUTPATIENT)
Age: 29
End: 2024-03-28

## 2024-03-28 NOTE — TELEPHONE ENCOUNTER
I called and I spoke to the patient, if she is still interested in bariatric surgery.    She will complete EKG , and will schedule psych evaluation, and schedule PCP appt.     The patient didn't specify by when, but I will follow up with her at the end of April, 2024.    Vy

## 2024-05-17 ENCOUNTER — TELEPHONE (OUTPATIENT)
Age: 29
End: 2024-05-17

## 2024-05-17 NOTE — TELEPHONE ENCOUNTER
I called and LVM for patient to carline back.   This is to ask if the patient is still in bariatric surgery.     Vy